# Patient Record
Sex: FEMALE | Race: BLACK OR AFRICAN AMERICAN | NOT HISPANIC OR LATINO | Employment: FULL TIME | URBAN - METROPOLITAN AREA
[De-identification: names, ages, dates, MRNs, and addresses within clinical notes are randomized per-mention and may not be internally consistent; named-entity substitution may affect disease eponyms.]

---

## 2024-02-12 ENCOUNTER — HOSPITAL ENCOUNTER (EMERGENCY)
Facility: HOSPITAL | Age: 34
Discharge: HOME | End: 2024-02-13
Attending: EMERGENCY MEDICINE
Payer: COMMERCIAL

## 2024-02-12 VITALS
OXYGEN SATURATION: 100 % | DIASTOLIC BLOOD PRESSURE: 64 MMHG | TEMPERATURE: 98 F | RESPIRATION RATE: 18 BRPM | WEIGHT: 183 LBS | HEART RATE: 86 BPM | SYSTOLIC BLOOD PRESSURE: 115 MMHG

## 2024-02-12 DIAGNOSIS — M79.651 PAIN IN BOTH THIGHS: Primary | ICD-10-CM

## 2024-02-12 DIAGNOSIS — M79.652 PAIN IN BOTH THIGHS: Primary | ICD-10-CM
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
## 2024-02-12 PROCEDURE — 25000003 PHARM REV CODE 250: Performed by: EMERGENCY MEDICINE

## 2024-02-12 PROCEDURE — 99283 EMERGENCY DEPT VISIT LOW MDM: CPT

## 2024-02-12 RX ORDER — ACETAMINOPHEN 325 MG/1
650 TABLET ORAL
Status: COMPLETED | OUTPATIENT
Start: 2024-02-12 | End: 2024-02-12

## 2024-02-12 RX ADMIN — ACETAMINOPHEN 650 MG: 325 TABLET ORAL at 11:02

## 2024-02-13 NOTE — ED NOTES
Introduced self to pt. Pt is currently 15 weeks pregnant. She presents to the ED for a fall. She states she did not hit her head. She fell on her knees but she wanted to get checked out. Pt rates pain 4/10 in her lower pelvis. Denies any chest pain or SOB. Pt AA&OX4. Fetal heart tone done. Monitored showed 145bpm.

## 2024-02-13 NOTE — DISCHARGE INSTRUCTIONS
Please make sure you are drinking plenty of fluids.  I recommend taking Tylenol 650 mg every 6 hours as needed for pain.  Please follow-up with your OBGYN for further evaluation and management.  Please return with any new or worsening symptoms.

## 2024-02-13 NOTE — ED PROVIDER NOTES
"Encounter Date: 2/12/2024       History     Chief Complaint   Patient presents with    Leg Pain     Patient states she was at the parades standing for a long time and started having bilateral upper leg pain that feels similar to post gym workout. She is 15 weeks gestation. Denies other acute symptoms.      33-year-old female presents emergency department complaining of leg pain.  Onset yesterday, worse today.  Patient states she has been standing up a lot at the parade.  States she feels a sore pain to her proximal thighs anteriorly, bilaterally.  States she was walking [place] from [place] her legs felt like they "locked up on me", causing her to fall forward onto her knees.  Presents for a "checkup" because she is 15 weeks pregnant.  Denies any leg swelling, abdominal pain, vaginal bleeding or discharge, fever, nausea, vomiting, diarrhea, or any other symptoms at this time.      Review of patient's allergies indicates:  No Known Allergies  No past medical history on file.  No past surgical history on file.  No family history on file.     Review of Systems   Constitutional:  Negative for chills and fever.   HENT:  Negative for congestion.    Respiratory:  Negative for cough and shortness of breath.    Cardiovascular:  Negative for chest pain.   Gastrointestinal:  Negative for abdominal pain.   Musculoskeletal:  Negative for back pain.   Neurological:  Negative for light-headedness and headaches.       Physical Exam     Initial Vitals [02/12/24 2311]   BP Pulse Resp Temp SpO2   119/62 94 18 97.7 °F (36.5 °C) 100 %      MAP       --         Physical Exam    Nursing note and vitals reviewed.  Constitutional: She appears well-developed and well-nourished. No distress.   HENT:   Head: Normocephalic and atraumatic.   Eyes: Conjunctivae and EOM are normal. Pupils are equal, round, and reactive to light.   Neck: Neck supple. No tracheal deviation present.   Normal range of motion.  Cardiovascular:  Normal rate and intact " "distal pulses.           Pulmonary/Chest: No respiratory distress.   Abdominal:   Uterus gravid, consistent with dates, nontender; fetal heart tones 145   Musculoskeletal:         General: Tenderness (Minimal to anterior thighs) present. No edema. Normal range of motion.      Cervical back: Normal range of motion and neck supple.     Neurological: She is alert and oriented to person, place, and time. She has normal strength. No cranial nerve deficit. GCS score is 15. GCS eye subscore is 4. GCS verbal subscore is 5. GCS motor subscore is 6.   Skin: Skin is warm and dry.         ED Course   Procedures  Labs Reviewed - No data to display              Medications   acetaminophen tablet 650 mg (650 mg Oral Given 2/12/24 4486)     Medical Decision Making  33-year-old female presents emergency department after falling to her knees requesting a "checkup" and complaining of leg pain      Differential:  Overuse injury, sprain, strain,      Patient given some Tylenol.  We had done a fetal heart tone check indicating fetal heart tones at 1:45 a.m..  However, patient states she did not hear much of a heartbeat and she was concerned that it was not accurate.  Fetal heart tones rechecked.  Patient instructed on home management, prompt follow-up with OB, strict return precautions given.    Amount and/or Complexity of Data Reviewed  External Data Reviewed: notes.     Details: Reviewed most recent OB note documenting baseline medications and past medical history    Risk  OTC drugs.                                      Clinical Impression:  Final diagnoses:  [M79.651, M79.652] Pain in both thighs (Primary)  [W19.XXXA] Fall, initial encounter          ED Disposition Condition    Discharge Stable          ED Prescriptions    None       Follow-up Information       Follow up With Specialties Details Why Contact Info    Your OBGYN  Schedule an appointment as soon as possible for a visit                Atif Beasley MD  02/13/24 " 0002